# Patient Record
Sex: MALE | Race: BLACK OR AFRICAN AMERICAN | Employment: FULL TIME | URBAN - METROPOLITAN AREA
[De-identification: names, ages, dates, MRNs, and addresses within clinical notes are randomized per-mention and may not be internally consistent; named-entity substitution may affect disease eponyms.]

---

## 2019-06-10 RX ORDER — ERGOCALCIFEROL 1.25 MG/1
CAPSULE ORAL
Refills: 1 | COMMUNITY
Start: 2019-05-17 | End: 2019-06-10 | Stop reason: SDUPTHER

## 2019-06-10 RX ORDER — ERGOCALCIFEROL 1.25 MG/1
CAPSULE ORAL
Qty: 5 CAPSULE | Refills: 1 | Status: SHIPPED | OUTPATIENT
Start: 2019-06-10 | End: 2019-06-11

## 2019-06-11 RX ORDER — ERGOCALCIFEROL 1.25 MG/1
50000 CAPSULE ORAL WEEKLY
Qty: 12 CAPSULE | Refills: 1 | Status: SHIPPED | OUTPATIENT
Start: 2019-06-11

## 2019-10-13 ENCOUNTER — APPOINTMENT (OUTPATIENT)
Dept: GENERAL RADIOLOGY | Age: 64
End: 2019-10-13
Payer: COMMERCIAL

## 2019-10-13 ENCOUNTER — APPOINTMENT (OUTPATIENT)
Dept: CT IMAGING | Age: 64
End: 2019-10-13
Payer: COMMERCIAL

## 2019-10-13 ENCOUNTER — HOSPITAL ENCOUNTER (EMERGENCY)
Age: 64
Discharge: HOME | End: 2019-10-13
Attending: EMERGENCY MEDICINE
Payer: COMMERCIAL

## 2019-10-13 VITALS
BODY MASS INDEX: 35.36 KG/M2 | DIASTOLIC BLOOD PRESSURE: 98 MMHG | RESPIRATION RATE: 16 BRPM | HEIGHT: 66 IN | HEART RATE: 89 BPM | OXYGEN SATURATION: 99 % | WEIGHT: 220 LBS | SYSTOLIC BLOOD PRESSURE: 136 MMHG | TEMPERATURE: 97.6 F

## 2019-10-13 DIAGNOSIS — F10.920 ACUTE ALCOHOLIC INTOXICATION WITHOUT COMPLICATION (HCC): Primary | ICD-10-CM

## 2019-10-13 LAB
ACETAMINOPHEN LEVEL: <5 MCG/ML (ref 10–30)
ALBUMIN SERPL-MCNC: 4.4 G/DL (ref 3.5–5.2)
ALP BLD-CCNC: 112 U/L (ref 40–129)
ALT SERPL-CCNC: 27 U/L (ref 0–40)
AMPHETAMINE SCREEN, URINE: NOT DETECTED
ANION GAP SERPL CALCULATED.3IONS-SCNC: 11 MMOL/L (ref 7–16)
AST SERPL-CCNC: 28 U/L (ref 0–39)
BACTERIA: ABNORMAL /HPF
BARBITURATE SCREEN URINE: NOT DETECTED
BASOPHILS ABSOLUTE: 0.02 E9/L (ref 0–0.2)
BASOPHILS RELATIVE PERCENT: 0.3 % (ref 0–2)
BENZODIAZEPINE SCREEN, URINE: NOT DETECTED
BILIRUB SERPL-MCNC: 0.2 MG/DL (ref 0–1.2)
BILIRUBIN URINE: NEGATIVE
BLOOD, URINE: NEGATIVE
BUN BLDV-MCNC: 31 MG/DL (ref 8–23)
CALCIUM SERPL-MCNC: 9.7 MG/DL (ref 8.6–10.2)
CANNABINOID SCREEN URINE: NOT DETECTED
CHLORIDE BLD-SCNC: 103 MMOL/L (ref 98–107)
CLARITY: CLEAR
CO2: 29 MMOL/L (ref 22–29)
COCAINE METABOLITE SCREEN URINE: NOT DETECTED
COLOR: YELLOW
CREAT SERPL-MCNC: 1.8 MG/DL (ref 0.7–1.2)
EOSINOPHILS ABSOLUTE: 0.19 E9/L (ref 0.05–0.5)
EOSINOPHILS RELATIVE PERCENT: 2.8 % (ref 0–6)
ETHANOL: 247 MG/DL (ref 0–0.08)
GFR AFRICAN AMERICAN: 47
GFR NON-AFRICAN AMERICAN: 47 ML/MIN/1.73
GLUCOSE BLD-MCNC: 109 MG/DL (ref 74–99)
GLUCOSE URINE: NEGATIVE MG/DL
HCT VFR BLD CALC: 37.5 % (ref 37–54)
HEMOGLOBIN: 12.8 G/DL (ref 12.5–16.5)
IMMATURE GRANULOCYTES #: 0.03 E9/L
IMMATURE GRANULOCYTES %: 0.4 % (ref 0–5)
KETONES, URINE: NEGATIVE MG/DL
LEUKOCYTE ESTERASE, URINE: ABNORMAL
LYMPHOCYTES ABSOLUTE: 2.03 E9/L (ref 1.5–4)
LYMPHOCYTES RELATIVE PERCENT: 29.6 % (ref 20–42)
Lab: NORMAL
MCH RBC QN AUTO: 31.6 PG (ref 26–35)
MCHC RBC AUTO-ENTMCNC: 34.1 % (ref 32–34.5)
MCV RBC AUTO: 92.6 FL (ref 80–99.9)
METHADONE SCREEN, URINE: NOT DETECTED
MONOCYTES ABSOLUTE: 0.51 E9/L (ref 0.1–0.95)
MONOCYTES RELATIVE PERCENT: 7.4 % (ref 2–12)
NEUTROPHILS ABSOLUTE: 4.07 E9/L (ref 1.8–7.3)
NEUTROPHILS RELATIVE PERCENT: 59.5 % (ref 43–80)
NITRITE, URINE: NEGATIVE
OPIATE SCREEN URINE: NOT DETECTED
PDW BLD-RTO: 12.7 FL (ref 11.5–15)
PH UA: 5.5 (ref 5–9)
PHENCYCLIDINE SCREEN URINE: NOT DETECTED
PLATELET # BLD: 207 E9/L (ref 130–450)
PMV BLD AUTO: 10.4 FL (ref 7–12)
POTASSIUM REFLEX MAGNESIUM: 4.2 MMOL/L (ref 3.5–5)
PROPOXYPHENE SCREEN: NOT DETECTED
PROTEIN UA: NEGATIVE MG/DL
RBC # BLD: 4.05 E12/L (ref 3.8–5.8)
RBC UA: ABNORMAL /HPF (ref 0–2)
RENAL EPITHELIAL, UA: ABNORMAL /HPF
SALICYLATE, SERUM: <0.3 MG/DL (ref 0–30)
SODIUM BLD-SCNC: 143 MMOL/L (ref 132–146)
SPECIFIC GRAVITY UA: 1.01 (ref 1–1.03)
TOTAL PROTEIN: 7.5 G/DL (ref 6.4–8.3)
TRICYCLIC ANTIDEPRESSANTS SCREEN SERUM: NEGATIVE NG/ML
UROBILINOGEN, URINE: 0.2 E.U./DL
WBC # BLD: 6.9 E9/L (ref 4.5–11.5)
WBC UA: ABNORMAL /HPF (ref 0–5)

## 2019-10-13 PROCEDURE — 71045 X-RAY EXAM CHEST 1 VIEW: CPT

## 2019-10-13 PROCEDURE — 80307 DRUG TEST PRSMV CHEM ANLYZR: CPT

## 2019-10-13 PROCEDURE — 70450 CT HEAD/BRAIN W/O DYE: CPT

## 2019-10-13 PROCEDURE — 80053 COMPREHEN METABOLIC PANEL: CPT

## 2019-10-13 PROCEDURE — 81001 URINALYSIS AUTO W/SCOPE: CPT

## 2019-10-13 PROCEDURE — 99284 EMERGENCY DEPT VISIT MOD MDM: CPT

## 2019-10-13 PROCEDURE — G0480 DRUG TEST DEF 1-7 CLASSES: HCPCS

## 2019-10-13 PROCEDURE — 85025 COMPLETE CBC W/AUTO DIFF WBC: CPT

## 2019-10-13 PROCEDURE — 36415 COLL VENOUS BLD VENIPUNCTURE: CPT

## 2019-10-13 ASSESSMENT — ENCOUNTER SYMPTOMS
BLOOD IN STOOL: 0
ALLERGIC/IMMUNOLOGIC NEGATIVE: 1
ABDOMINAL PAIN: 0
CHEST TIGHTNESS: 0
COUGH: 0
NAUSEA: 0
VOMITING: 0
COLOR CHANGE: 0
BACK PAIN: 0
WHEEZING: 0
EYE PAIN: 0
CHOKING: 0
DIARRHEA: 0

## 2021-02-24 ENCOUNTER — HOSPITAL ENCOUNTER (OUTPATIENT)
Age: 66
Discharge: HOME | End: 2021-02-26

## 2021-02-24 PROCEDURE — 88305 TISSUE EXAM BY PATHOLOGIST: CPT

## 2021-02-24 PROCEDURE — 88342 IMHCHEM/IMCYTCHM 1ST ANTB: CPT

## 2023-06-10 PROBLEM — R07.9 CHEST PAIN: Status: ACTIVE | Noted: 2023-06-10

## 2024-11-22 ENCOUNTER — HOSPITAL ENCOUNTER (OUTPATIENT)
Age: 69
Discharge: HOME | End: 2024-11-22
Admitting: INTERNAL MEDICINE
Payer: MEDICARE

## 2024-11-22 VITALS
SYSTOLIC BLOOD PRESSURE: 116 MMHG | DIASTOLIC BLOOD PRESSURE: 88 MMHG | RESPIRATION RATE: 16 BRPM | OXYGEN SATURATION: 96 % | TEMPERATURE: 97.3 F | BODY MASS INDEX: 35.47 KG/M2 | HEIGHT: 67 IN | WEIGHT: 226 LBS | HEART RATE: 84 BPM

## 2024-11-22 DIAGNOSIS — I43 CARDIOMYOPATHY DUE TO SYSTEMIC DISEASE (HCC): ICD-10-CM

## 2024-11-22 PROBLEM — I25.10 CAD (CORONARY ARTERY DISEASE): Status: ACTIVE | Noted: 2024-11-22

## 2024-11-22 LAB
ABO + RH BLD: NORMAL
ARM BAND NUMBER: NORMAL
BLOOD BANK SAMPLE EXPIRATION: NORMAL
BLOOD GROUP ANTIBODIES SERPL: NEGATIVE
ECHO BSA: 2.2 M2

## 2024-11-22 PROCEDURE — 86901 BLOOD TYPING SEROLOGIC RH(D): CPT

## 2024-11-22 PROCEDURE — 2580000003 HC RX 258: Performed by: INTERNAL MEDICINE

## 2024-11-22 PROCEDURE — 6370000000 HC RX 637 (ALT 250 FOR IP): Performed by: INTERNAL MEDICINE

## 2024-11-22 PROCEDURE — 93458 L HRT ARTERY/VENTRICLE ANGIO: CPT | Performed by: INTERNAL MEDICINE

## 2024-11-22 PROCEDURE — 2500000003 HC RX 250 WO HCPCS: Performed by: INTERNAL MEDICINE

## 2024-11-22 PROCEDURE — 86900 BLOOD TYPING SEROLOGIC ABO: CPT

## 2024-11-22 PROCEDURE — C1769 GUIDE WIRE: HCPCS | Performed by: INTERNAL MEDICINE

## 2024-11-22 PROCEDURE — C1894 INTRO/SHEATH, NON-LASER: HCPCS | Performed by: INTERNAL MEDICINE

## 2024-11-22 PROCEDURE — 6360000002 HC RX W HCPCS: Performed by: INTERNAL MEDICINE

## 2024-11-22 PROCEDURE — 2709999900 HC NON-CHARGEABLE SUPPLY: Performed by: INTERNAL MEDICINE

## 2024-11-22 PROCEDURE — 6360000004 HC RX CONTRAST MEDICATION: Performed by: INTERNAL MEDICINE

## 2024-11-22 PROCEDURE — 86850 RBC ANTIBODY SCREEN: CPT

## 2024-11-22 RX ORDER — FENTANYL CITRATE 50 UG/ML
INJECTION, SOLUTION INTRAMUSCULAR; INTRAVENOUS PRN
Status: DISCONTINUED | OUTPATIENT
Start: 2024-11-22 | End: 2024-11-22 | Stop reason: HOSPADM

## 2024-11-22 RX ORDER — NITROGLYCERIN 20 MG/100ML
INJECTION INTRAVENOUS CONTINUOUS PRN
Status: COMPLETED | OUTPATIENT
Start: 2024-11-22 | End: 2024-11-22

## 2024-11-22 RX ORDER — SODIUM CHLORIDE 0.9 % (FLUSH) 0.9 %
5-40 SYRINGE (ML) INJECTION PRN
Status: DISCONTINUED | OUTPATIENT
Start: 2024-11-22 | End: 2024-11-22 | Stop reason: HOSPADM

## 2024-11-22 RX ORDER — HEPARIN SODIUM 1000 [USP'U]/ML
INJECTION, SOLUTION INTRAVENOUS; SUBCUTANEOUS PRN
Status: DISCONTINUED | OUTPATIENT
Start: 2024-11-22 | End: 2024-11-22 | Stop reason: HOSPADM

## 2024-11-22 RX ORDER — ASPIRIN 81 MG/1
243 TABLET, CHEWABLE ORAL ONCE
Status: COMPLETED | OUTPATIENT
Start: 2024-11-22 | End: 2024-11-22

## 2024-11-22 RX ORDER — IOPAMIDOL 755 MG/ML
INJECTION, SOLUTION INTRAVASCULAR PRN
Status: DISCONTINUED | OUTPATIENT
Start: 2024-11-22 | End: 2024-11-22 | Stop reason: HOSPADM

## 2024-11-22 RX ORDER — SODIUM CHLORIDE 9 MG/ML
INJECTION, SOLUTION INTRAVENOUS CONTINUOUS
Status: DISCONTINUED | OUTPATIENT
Start: 2024-11-22 | End: 2024-11-22 | Stop reason: HOSPADM

## 2024-11-22 RX ORDER — MIDAZOLAM HYDROCHLORIDE 1 MG/ML
INJECTION, SOLUTION INTRAMUSCULAR; INTRAVENOUS PRN
Status: DISCONTINUED | OUTPATIENT
Start: 2024-11-22 | End: 2024-11-22 | Stop reason: HOSPADM

## 2024-11-22 RX ORDER — SODIUM CHLORIDE 9 MG/ML
INJECTION, SOLUTION INTRAVENOUS PRN
Status: DISCONTINUED | OUTPATIENT
Start: 2024-11-22 | End: 2024-11-22 | Stop reason: HOSPADM

## 2024-11-22 RX ORDER — SODIUM CHLORIDE 9 MG/ML
INJECTION, SOLUTION INTRAVENOUS CONTINUOUS PRN
Status: COMPLETED | OUTPATIENT
Start: 2024-11-22 | End: 2024-11-22

## 2024-11-22 RX ORDER — VERAPAMIL HYDROCHLORIDE 2.5 MG/ML
INJECTION, SOLUTION INTRAVENOUS PRN
Status: DISCONTINUED | OUTPATIENT
Start: 2024-11-22 | End: 2024-11-22 | Stop reason: HOSPADM

## 2024-11-22 RX ORDER — SODIUM CHLORIDE 0.9 % (FLUSH) 0.9 %
5-40 SYRINGE (ML) INJECTION EVERY 12 HOURS SCHEDULED
Status: DISCONTINUED | OUTPATIENT
Start: 2024-11-22 | End: 2024-11-22 | Stop reason: HOSPADM

## 2024-11-22 RX ADMIN — ASPIRIN 243 MG: 81 TABLET, CHEWABLE ORAL at 06:46

## 2024-11-22 RX ADMIN — SODIUM CHLORIDE: 9 INJECTION, SOLUTION INTRAVENOUS at 09:16

## 2024-11-22 NOTE — PROGRESS NOTES
Extended Stay Recovery Discharge Documentation    Final procedure site check completed, stable for discharge- Yes  Ambulated without issue post recovery completion, gait steady.   Peripheral IV sites removed (see IV Flowsheet) and site asymptomatic- Yes  Patient dressed self without assistance.   Discharge instructions reviewed with Patient and verbalized understanding.   Patient discharged Home with daughter at 1245 PM  Monitor cleaned and placed back in nurses' station- Yes           First encounter with the pt, pt received from triage with complaints of GI bleed s/s. Pt currently denies any bloody/coffee ground emesis as well as rectal bleeding. Pt is a/ox4 and verbal. Speech is clear and able to speak in full sentences without distress. Airway is patent with no obstruction nor blocking secretions. Breathing is even and unlabored on room air. Pt has strong pulses palpated bilaterally. Neuro check is wnl. Full rom but requires assistance. Pt denies chest pain, n/v and sob. No acute distress noted. Pt has call light within the reach of the pt at the bedside. Will continue to monitor the pt.

## 2024-11-22 NOTE — H&P
H&P in paper chart.  Reviewed.  No change.  Patient with abnormal stress test.  Here for coronary angiography +/- PCI as per his primary cardiologist.  The procedure, risks, benefits and alternative therapies were explained to patient.  He understood and consented to proceed.    Electronically signed by Barry Dave MD on 11/22/2024 at 7:55 AM

## (undated) DEVICE — GLIDESHEATH NITINOL HYDROPHILIC COATED INTRODUCER SHEATH: Brand: GLIDESHEATH

## (undated) DEVICE — GUIDEWIRE VASC L260CM 0.035IN J TIP L3MM PTFE FIX COR NAMIC

## (undated) DEVICE — KIT MFLD ISOLATN NACL CNTRST PRT TBNG SPIK W/ PRSS TRNSDUC

## (undated) DEVICE — CATHETER COR DIAG JUDKINS L 3.5 CRV 6FR 100CM 0 SIDE H

## (undated) DEVICE — Device

## (undated) DEVICE — CANNULA NSL CANN NSL L25FT TBNG AD O2 SUP SFT UC

## (undated) DEVICE — KIT ANGIO W/ AT P65 PREM HND CTRL FOR CNTRST DEL ANGIOTOUCH

## (undated) DEVICE — PAD, DEFIB, ADULT, RADIOTRAN, PHYSIO, LO: Brand: MEDLINE

## (undated) DEVICE — CATHETER DIAG 6FR L100CM LUMN ID0.056IN JR4 CRV 0 SIDE H

## (undated) DEVICE — SURGICAL PROCEDURE KIT 2 W

## (undated) DEVICE — CATHETER DIAG 6FR L110CM PIGTAILS CRV STYL PIG145 DXTERITY

## (undated) DEVICE — BAND COMPR L24CM REG CLR PLAS HEMSTAT EXT HK AND LOOP RETEN